# Patient Record
Sex: MALE | Race: WHITE | Employment: OTHER | URBAN - METROPOLITAN AREA
[De-identification: names, ages, dates, MRNs, and addresses within clinical notes are randomized per-mention and may not be internally consistent; named-entity substitution may affect disease eponyms.]

---

## 2018-08-14 ENCOUNTER — HOSPITAL ENCOUNTER (EMERGENCY)
Age: 33
Discharge: HOME | End: 2018-08-14
Attending: EMERGENCY MEDICINE
Payer: COMMERCIAL

## 2018-08-14 VITALS
DIASTOLIC BLOOD PRESSURE: 78 MMHG | OXYGEN SATURATION: 98 % | HEART RATE: 68 BPM | HEIGHT: 74 IN | SYSTOLIC BLOOD PRESSURE: 122 MMHG | RESPIRATION RATE: 20 BRPM | WEIGHT: 161.5 LBS | BODY MASS INDEX: 20.73 KG/M2 | TEMPERATURE: 98.1 F

## 2018-08-14 DIAGNOSIS — B80 PINWORMS: Primary | ICD-10-CM

## 2018-08-14 PROCEDURE — 99282 EMERGENCY DEPT VISIT SF MDM: CPT

## 2018-08-14 NOTE — ED TRIAGE NOTES
Triage Note: Patient is coming in after seeing worms in stool last night. Denies abdominal pain. Denies nausea and vomiting. Patient states has been living in unsanitary conditions for the past month since moving down from [place].

## 2018-08-14 NOTE — DISCHARGE INSTRUCTIONS
Pinworms: Care Instructions  Your Care Instructions  A pinworm is a type of parasite that lives in the lower digestive system of humans. Pinworms survive by getting nutrients from the food you eat. You are most likely to get pinworms by swallowing their eggs. This happens when someone with pinworms scratches around the anus, gets eggs on his or her hands (or under the fingernails), and touches you or an object that you later touch. Many people feel embarrassed about having \"worms. \" Pinworm infections can happen to anyone, are spread very easily, and are not related to being unclean. They are especially common in children. They are also easily treated. If you or someone in your family has pinworms that keep coming back, or if more than one family member is infected, every member of your family or household should be treated. Follow-up care is a key part of your treatment and safety. Be sure to make and go to all appointments, and call your doctor if you are having problems. It's also a good idea to know your test results and keep a list of the medicines you take. How can you care for yourself at home? · Take your medicine exactly as prescribed. Call your doctor if you have any problems with your medicine. · Wash your hands well and often. · Cut your fingernails short, and keep them trimmed. This can prevent eggs from sticking under your nails. · Wash all clothes, towels, and bedding. Do this often, and especially on the first day after treatment. Dry them in a heated dryer. · Do not scratch. Itching around the anus caused by a pinworm infection usually happens at night. Try wearing gloves, pajamas, and close-fitting clothing to help prevent scratching. · Bathe carefully every day. Be sure to clean the skin around the anus. This will remove pinworm eggs. Showers may be better than baths because you have less chance of getting water that has pinworm eggs into your mouth.   · Do not fan or fluff the bedding of a person with pinworms. Doing this can release pinworm eggs into the air. You can swallow eggs that are in the air when you breathe through your mouth. When should you call for help? Call your doctor now or seek immediate medical care if:    · You develop other symptoms, including:  ¨ A fever or belly pain. ¨ Redness, tenderness, or swelling in the genital area. ¨ Itching in the genital area or vagina. ¨ Pain when you urinate. ¨ A frequent or urgent need to urinate. ¨ Lack of control of urination.    Watch closely for changes in your health, and be sure to contact your doctor if:    · Your doctor gave you medicine, and the pinworms have not cleared up as expected (usually within 4 to 6 weeks).     · You are having side effects from medicine for pinworms. Where can you learn more? Go to http://gael-salvador.info/. Enter [id number] [id number] in the search box to learn more about \"Pinworms: Care Instructions. \"  Current as of: May 12, 2017  Content Version: 11.7  © 2048-0971 [employer]. Care instructions adapted under license by [employer] (which disclaims liability or warranty for this information). If you have questions about a medical condition or this instruction, always ask your healthcare professional. [address] any warranty or liability for your use of this information.

## 2018-08-14 NOTE — ED PROVIDER NOTES
Patient is a 34 y.o. male presenting with other event. Other   Pertinent negatives include no abdominal pain, no headaches and no shortness of breath. Pt reports that he had some anal area itching and thinks that he saw a  \"pin worm\" mixed with his stool last evening. He states that he did a \"tape test\" this morning per google and states that he did not see anything. He is requesting to be treated for pin worms; states that his recent living conditions and travel to [facility] have him concerned. He also reports that he is traveling to [facility] this weekend Denies fever, cold symptoms, headache, neck pain, visual changes, focal weakness, unexplained weight changes or rash. Denies any difficulty breathing, difficulty swallowing, SOB, chest pain or abdominal pain. Denies any nausea, vomiting or diarrhea. Pt. Reports that he has not had any medications today prior to arrival.        History reviewed. No pertinent past medical history. Past Surgical History:   Procedure Laterality Date    ABDOMEN SURGERY PROC UNLISTED      HX APPENDECTOMY           History reviewed. No pertinent family history. Social History     Social History    Marital status: N/A     Spouse name: N/A    Number of children: N/A    Years of education: N/A     Occupational History    Not on file. Social History Main Topics    Smoking status: Never Smoker    Smokeless tobacco: Never Used    Alcohol use No    Drug use: Yes     Special: Marijuana    Sexual activity: Not on file     Other Topics Concern    Not on file     Social History Narrative    No narrative on file         ALLERGIES: Sulfa (sulfonamide antibiotics)    Review of Systems   Constitutional: Negative for activity change and appetite change. HENT: Negative for facial swelling, sore throat and trouble swallowing. Eyes: Negative. Respiratory: Negative for shortness of breath. Cardiovascular: Negative.     Gastrointestinal: Negative for abdominal pain, diarrhea and vomiting. Genitourinary: Negative for dysuria. Musculoskeletal: Negative for back pain and neck pain. Skin: Negative for color change. Neurological: Negative for headaches. Psychiatric/Behavioral: Negative. Vitals:    08/14/18 0944   BP: 122/78   Pulse: 68   Resp: 20   Temp: 98.1 °F (36.7 °C)   SpO2: 98%   Weight: 73.3 kg (161 lb 8 oz)   Height: 6' 2\" (1.88 m)            Physical Exam   Constitutional: He is oriented to person, place, and time. He appears well-nourished. White male; student; non smoker   HENT:   Head: Normocephalic. Mouth/Throat: Oropharynx is clear and moist.   Eyes: Pupils are equal, round, and reactive to light. Neck: Normal range of motion. Neck supple. Cardiovascular: Normal rate and regular rhythm. Pulmonary/Chest: Effort normal and breath sounds normal.   Abdominal: Soft. Bowel sounds are normal.   Musculoskeletal: Normal range of motion. Neurological: He is alert and oriented to person, place, and time. Skin: Skin is warm and dry. No rash noted. Nursing note and vitals reviewed. [facility]      ED Course       Procedures      Discussed plan of care with Dr. Davis Garcia. 10:09 AM  Patient's results and plan of care have been reviewed with him. Patient has verbally conveyed his understanding and agreement of his signs, symptoms, diagnosis, treatment and prognosis and additionally agrees to follow up as recommended or return to the Emergency Room should his condition change prior to follow-up. Discharge instructions have also been provided to the patient with some educational information regarding his diagnosis as well a list of reasons why he would want to return to the ER prior to his  follow-up appointment should his condition change. Lesley Severe, NP